# Patient Record
Sex: MALE | Race: WHITE | NOT HISPANIC OR LATINO | Employment: UNEMPLOYED | URBAN - METROPOLITAN AREA
[De-identification: names, ages, dates, MRNs, and addresses within clinical notes are randomized per-mention and may not be internally consistent; named-entity substitution may affect disease eponyms.]

---

## 2024-10-15 ENCOUNTER — HOSPITAL ENCOUNTER (EMERGENCY)
Facility: HOSPITAL | Age: 59
Discharge: OTHER INSTITUTION | End: 2024-10-16
Attending: EMERGENCY MEDICINE
Payer: COMMERCIAL

## 2024-10-15 DIAGNOSIS — F10.929 ALCOHOL INTOXICATION (HCC): Primary | ICD-10-CM

## 2024-10-15 PROCEDURE — 99284 EMERGENCY DEPT VISIT MOD MDM: CPT

## 2024-10-16 ENCOUNTER — APPOINTMENT (EMERGENCY)
Dept: CT IMAGING | Facility: HOSPITAL | Age: 59
End: 2024-10-16
Payer: COMMERCIAL

## 2024-10-16 VITALS
RESPIRATION RATE: 18 BRPM | BODY MASS INDEX: 27.85 KG/M2 | DIASTOLIC BLOOD PRESSURE: 98 MMHG | HEART RATE: 68 BPM | TEMPERATURE: 97.7 F | SYSTOLIC BLOOD PRESSURE: 162 MMHG | WEIGHT: 210.1 LBS | OXYGEN SATURATION: 93 % | HEIGHT: 73 IN

## 2024-10-16 PROCEDURE — 99284 EMERGENCY DEPT VISIT MOD MDM: CPT | Performed by: EMERGENCY MEDICINE

## 2024-10-16 PROCEDURE — 70450 CT HEAD/BRAIN W/O DYE: CPT

## 2024-10-16 NOTE — ED NOTES
BAT Completed 0.221 currently. Patient reports no concerns, Vital signs stable. Plan of care ongoing.     Sarai Clements RN  10/16/24 7534

## 2024-10-16 NOTE — ED NOTES
Contacted [facility] and spoke to Teena of their Nursing department. Per Teena they would accept the patient when his BAT was below 0.24. Advised her the patient's BAT was 0.221. Notified Arabella ADKINS would fax d/c papers as requested. Once the Staff at [facility] received documentation, they would set up his ride. Provider , Charge and patient notified.      Sarai Clements, RN  10/16/24 0230

## 2024-10-16 NOTE — ED PROVIDER NOTES
"Time reflects when diagnosis was documented in both MDM as applicable and the Disposition within this note       Time User Action Codes Description Comment    10/16/2024  2:33 AM Javier Palomino Add [F10.929] Alcohol intoxication (HCC)     10/16/2024  4:27 AM Javier Palomino Modify [F10.929] Alcohol intoxication (HCC)           ED Disposition       ED Disposition   Discharge    Condition   Stable    Date/Time   Wed Oct 16, 2024  2:34 AM    Comment   Jimmy Garay discharge to home/self care.                   Assessment & Plan       Medical Decision Making  59-year-old male presents to the emergency room due to his alcohol level being too high from his rehabilitation facility.    Patient denies any symptoms.  Patient is aware that he is in the hospital and that he was going to rehabilitation.    Patient states he drinks alcohol every day and states that \"I need to get sober.\"    Patient denies use of any illicit substances.  Patient denies use of any alcohol substitutes.  Patient denies any falls or trauma.  Patient denies any thoughts self-harm or harm to others.    Impression and plan: Alcohol intoxication.  Patient's alcohol level is notably elevated on initial point-of-care testing.  I offered patient additional evaluation which he declined.  Despite patient's alcohol level being elevated, he appears to have capacity make his medical decisions.  Patient is amenable to monitoring in the emergency room until he is able to be cleared to go to his facility for continued management of his alcohol use.  Will monitor patient, reassess, and reevaluate.    Amount and/or Complexity of Data Reviewed  Radiology: ordered.        ED Course as of 10/16/24 2132   Wed Oct 16, 2024   0229 Patient's alcohol level improved below the limit requested by his rehab facility.  I reassessed the patient who continues to deny any symptoms.  Patient requesting discharge.  Will discuss with his facility.   0431 Patient CT without acute " findings.  I discussed with him the need for follow-up with primary care for continued management provided information and referral for this.  Patient being return to his rehabilitation facility for continued management of his alcohol.       Medications - No data to display    ED Risk Strat Scores                CIWA-Ar Score       Row Name 10/16/24 0100 10/16/24 0000 10/15/24 2311       CIWA-Ar    Blood Pressure 142/77 -- 152/79    Pulse 77 -- 60    Nausea and Vomiting 0 0 0    Tactile Disturbances 0 0 0    Tremor 1 2 2    Auditory Disturbances 0 0 0    Paroxysmal Sweats 0 0 0    Visual Disturbances 0 0 0    Anxiety 0 0 0    Headache, Fullness in Head 0 0 0    Agitation 0 0 0    Orientation and Clouding of Sensorium 1 1 2    CIWA-Ar Total 2 3 4                            SBIRT 20yo+      Flowsheet Row Most Recent Value   Initial Alcohol Screen: US AUDIT-C     1. How often do you have a drink containing alcohol? 6 Filed at: 10/15/2024 2309   2. How many drinks containing alcohol do you have on a typical day you are drinking?  6 Filed at: 10/15/2024 2309   3a. Male UNDER 65: How often do you have five or more drinks on one occasion? 6 Filed at: 10/15/2024 2309   Audit-C Score 18 Filed at: 10/15/2024 2309   Full Alcohol Screen: US AUDIT    4. How often during the last year have you found that you were not able to stop drinking once you had started? 4 Filed at: 10/15/2024 2309   5. How often during past year have you failed to do what was normally expected of you because of drinking?  4 Filed at: 10/15/2024 2309   6. How often in past year have you needed a first drink in the morning to get yourself going after a heavy drinking session?  1 Filed at: 10/15/2024 2309   7. How often in past year have you had feeling of guilt or remorse after drinking?  3 Filed at: 10/15/2024 2309   8. How often in past year have you been unable to remember what happened night before because you had been drinking?  4 Filed at: 10/15/2024  2309   9. Have you or someone else been injured as a result of your drinking?  0 Filed at: 10/15/2024 2309   10. Has a relative, friend, doctor or other health worker been concerned about your drinking and suggested you cut down?  4 Filed at: 10/15/2024 2309   AUDIT Total Score 38 Filed at: 10/15/2024 2309   DOREEN: How many times in the past year have you...    Used an illegal drug or used a prescription medication for non-medical reasons? Never Filed at: 10/15/2024 2309                            History of Present Illness       Chief Complaint   Patient presents with    Alcohol Intoxication     Came by EMS from [facility] due to blood alcohol being too high. (0.305)        Past Medical History:   Diagnosis Date    Disease of thyroid gland     Hypertension       History reviewed. No pertinent surgical history.   History reviewed. No pertinent family history.   Social History     Tobacco Use    Smoking status: Every Day     Current packs/day: 1.00     Average packs/day: 1 pack/day for 40.8 years (40.8 ttl pk-yrs)     Types: Cigarettes     Start date: 1984   Substance Use Topics    Alcohol use: Yes     Alcohol/week: 10.0 standard drinks of alcohol     Types: 10 Glasses of wine per week    Drug use: Never      E-Cigarette/Vaping      E-Cigarette/Vaping Substances      I have reviewed and agree with the history as documented.     HPI    Review of Systems        Objective       ED Triage Vitals [10/15/24 2305]   Temperature Pulse Blood Pressure Respirations SpO2 Patient Position - Orthostatic VS   97.7 °F (36.5 °C) 60 152/79 18 96 % Lying      Temp Source Heart Rate Source BP Location FiO2 (%) Pain Score    Oral Monitor Right arm -- No Pain      Vitals      Date and Time Temp Pulse SpO2 Resp BP Pain Score FACES Pain Rating User   10/16/24 0302 -- 68 93 % -- 162/98 -- --    10/16/24 0230 -- 72 92 % 18 103/59 No Pain -- KM   10/16/24 0200 -- 75 92 % 18 106/57 No Pain -- KM   10/16/24 0100 -- 77 -- -- 142/77 -- -- KW    10/16/24 0030 -- -- 92 % -- -- -- -- KM   10/16/24 0030 -- 75 -- -- 124/70 -- -- KW   10/16/24 0000 -- 72 92 % -- 130/73 -- -- KW   10/15/24 2311 -- 60 -- -- 152/79 -- -- KW   10/15/24 2305 97.7 °F (36.5 °C) 60 96 % 18 152/79 No Pain -- KW            Physical Exam  Constitutional:       Appearance: Normal appearance.   Cardiovascular:      Rate and Rhythm: Normal rate and regular rhythm.   Abdominal:      Palpations: Abdomen is soft.      Tenderness: There is no abdominal tenderness. There is no guarding or rebound.   Neurological:      General: No focal deficit present.      Mental Status: He is alert.      Gait: Gait normal.   Psychiatric:         Mood and Affect: Mood and affect normal.         Speech: Speech is not slurred.         Thought Content: Thought content does not include homicidal or suicidal ideation. Thought content does not include homicidal or suicidal plan.         Results Reviewed       None            CT head without contrast   Final Interpretation by Nicole Amanda MD (10/16 0357)      No acute intracranial abnormality. Mild microangiopathy. Old bilateral lacunae.                  Workstation performed: [id number]             Procedures    ED Medication and Procedure Management   None     There are no discharge medications for this patient.      ED SEPSIS DOCUMENTATION   Time reflects when diagnosis was documented in both MDM as applicable and the Disposition within this note       Time User Action Codes Description Comment    10/16/2024  2:33 AM Javier Palomino Add [F10.929] Alcohol intoxication (HCC)     10/16/2024  4:27 AM Javier aPlomino Modify [F10.929] Alcohol intoxication (HCC)                  Javier Palomino MD  10/16/24 6360

## 2024-10-16 NOTE — ED NOTES
Dc paperwork faxed to [facility]. [facility] to arrange  of pt from ZORAN Vargas RN  10/16/24 5173

## 2025-04-03 ENCOUNTER — HOSPITAL ENCOUNTER (EMERGENCY)
Facility: HOSPITAL | Age: 60
Discharge: HOME | End: 2025-04-03
Attending: EMERGENCY MEDICINE
Payer: COMMERCIAL

## 2025-04-03 VITALS
TEMPERATURE: 97.6 F | RESPIRATION RATE: 17 BRPM | DIASTOLIC BLOOD PRESSURE: 92 MMHG | HEART RATE: 64 BPM | SYSTOLIC BLOOD PRESSURE: 177 MMHG | OXYGEN SATURATION: 99 %

## 2025-04-03 DIAGNOSIS — Z00.8 ENCOUNTER FOR PSYCHOLOGICAL EVALUATION: Primary | ICD-10-CM

## 2025-04-03 PROCEDURE — 99284 EMERGENCY DEPT VISIT MOD MDM: CPT | Performed by: EMERGENCY MEDICINE

## 2025-04-03 PROCEDURE — 99282 EMERGENCY DEPT VISIT SF MDM: CPT

## 2025-04-03 RX ORDER — HYDROXYZINE HYDROCHLORIDE 25 MG/1
25 TABLET, FILM COATED ORAL ONCE
Status: COMPLETED | OUTPATIENT
Start: 2025-04-03 | End: 2025-04-03

## 2025-04-03 RX ADMIN — HYDROXYZINE HYDROCHLORIDE 25 MG: 25 TABLET, FILM COATED ORAL at 15:59

## 2025-04-03 NOTE — ED PROVIDER NOTES
Time reflects when diagnosis was documented in both MDM as applicable and the Disposition within this note       Time User Action Codes Description Comment    4/3/2025  4:17 PM Marcela Bello Add [Z00.8] Encounter for psychological evaluation           ED Disposition       ED Disposition   Discharge    Condition   Stable    Date/Time   Thu Apr 3, 2025  4:17 PM    Comment   Jimmy Garay discharge to home/self care.                   Assessment & Plan       Medical Decision Making  Patient is a pleasant 60 yoM who presents from [facility] due to SI. Reports had an episode of SI after a triggering therapy session. No longer reporting SI. States he feels safe  at the facility - no access to controlled meds or alcohol. Is comfortable with staff and strongly prefers to return to [facility] to complete treatment for EtOH dependence.   As he is endorsing positive motivation for treatment, and safety in his current environment, benefit of inpatient psychiatric stay likely to be low at this time while also risking completion of therapy for etoh dependence.     Amount and/or Complexity of Data Reviewed  External Data Reviewed: notes.     Details: Med rec and [facility] facility documentation reviewed    Risk  Prescription drug management.             Medications   hydrOXYzine HCL (ATARAX) tablet 25 mg (25 mg Oral Given 4/3/25 4704)       ED Risk Strat Scores                                                History of Present Illness       Chief Complaint   Patient presents with    Psychiatric Evaluation     Pt BIBA from [facility] with thoughts of self harm after a triggering therapy session. Pt had thoughts a month ago when his drinking was bad. Pt's last drink was 5 days ago. Counselor at [facility] requested he be evaluated       Past Medical History:   Diagnosis Date    Cataract     Disease of thyroid gland     Hypertension     Neuropathy       History reviewed. No pertinent surgical history.   History reviewed. No  pertinent family history.   Social History     Tobacco Use    Smoking status: Every Day     Current packs/day: 1.00     Average packs/day: 1 pack/day for 41.3 years (41.3 ttl pk-yrs)     Types: Cigarettes     Start date: 1984    Smokeless tobacco: Never   Vaping Use    Vaping status: Never Used   Substance Use Topics    Alcohol use: Yes     Alcohol/week: 10.0 standard drinks of alcohol     Types: 10 Glasses of wine per week     Comment: last drink 5 days ago    Drug use: Never      E-Cigarette/Vaping    E-Cigarette Use Never User       E-Cigarette/Vaping Substances    Nicotine No     THC No     CBD No     Flavoring No     Other No     Unknown No       I have reviewed and agree with the history as documented.     Patient presents with report of prior SI          Review of Systems   Psychiatric/Behavioral:  Negative for self-injury. The patient is nervous/anxious.    All other systems reviewed and are negative.          Objective       ED Triage Vitals [04/03/25 1608]   Temperature Pulse Blood Pressure Respirations SpO2 Patient Position - Orthostatic VS   97.6 °F (36.4 °C) 64 (!) 177/92 17 99 % Sitting      Temp Source Heart Rate Source BP Location FiO2 (%) Pain Score    Oral Monitor Right arm -- --      Vitals      Date and Time Temp Pulse SpO2 Resp BP Pain Score FACES Pain Rating User   04/03/25 1610 97.6 °F (36.4 °C) 64 99 % 17 177/92 -- -- KW   04/03/25 1608 97.6 °F (36.4 °C) 64 99 % 17 177/92 -- -- KW            Physical Exam  Vitals and nursing note reviewed.   Constitutional:       General: He is not in acute distress.     Appearance: Normal appearance.   HENT:      Head: Normocephalic and atraumatic.      Right Ear: External ear normal.      Left Ear: External ear normal.      Nose: Nose normal.   Cardiovascular:      Rate and Rhythm: Normal rate and regular rhythm.   Pulmonary:      Effort: Pulmonary effort is normal.      Breath sounds: Normal breath sounds.   Abdominal:      General: There is no distension.       Palpations: Abdomen is soft.      Tenderness: There is no abdominal tenderness.   Musculoskeletal:      Right lower leg: No edema.      Left lower leg: No edema.   Skin:     General: Skin is warm and dry.   Neurological:      General: No focal deficit present.      Mental Status: He is alert and oriented to person, place, and time. Mental status is at baseline.   Psychiatric:         Mood and Affect: Mood normal.         Behavior: Behavior normal. Behavior is cooperative.         Thought Content: Thought content normal. Thought content does not include homicidal or suicidal ideation.         Judgment: Judgment normal.         Results Reviewed       None            No orders to display       Procedures    ED Medication and Procedure Management   None     There are no discharge medications for this patient.    No discharge procedures on file.  ED SEPSIS DOCUMENTATION   Time reflects when diagnosis was documented in both MDM as applicable and the Disposition within this note       Time User Action Codes Description Comment    4/3/2025  4:17 PM Marcela Bello Add [Z00.8] Encounter for psychological evaluation                  Marcela Bello MD  04/03/25 1188

## 2025-06-09 ENCOUNTER — HOSPITAL ENCOUNTER (EMERGENCY)
Facility: HOSPITAL | Age: 60
Discharge: HOME | End: 2025-06-09
Attending: EMERGENCY MEDICINE | Admitting: EMERGENCY MEDICINE
Payer: COMMERCIAL

## 2025-06-09 ENCOUNTER — APPOINTMENT (EMERGENCY)
Dept: RADIOLOGY | Facility: HOSPITAL | Age: 60
End: 2025-06-09
Payer: COMMERCIAL

## 2025-06-09 VITALS
TEMPERATURE: 97.4 F | HEART RATE: 63 BPM | DIASTOLIC BLOOD PRESSURE: 85 MMHG | WEIGHT: 210.1 LBS | BODY MASS INDEX: 27.72 KG/M2 | SYSTOLIC BLOOD PRESSURE: 147 MMHG | RESPIRATION RATE: 16 BRPM | OXYGEN SATURATION: 92 %

## 2025-06-09 DIAGNOSIS — J06.9 VIRAL URI WITH COUGH: Primary | ICD-10-CM

## 2025-06-09 LAB
FLUAV AG UPPER RESP QL IA.RAPID: NEGATIVE
FLUBV AG UPPER RESP QL IA.RAPID: NEGATIVE
SARS-COV+SARS-COV-2 AG RESP QL IA.RAPID: NEGATIVE

## 2025-06-09 PROCEDURE — 71046 X-RAY EXAM CHEST 2 VIEWS: CPT

## 2025-06-09 PROCEDURE — 99284 EMERGENCY DEPT VISIT MOD MDM: CPT

## 2025-06-09 PROCEDURE — 87811 SARS-COV-2 COVID19 W/OPTIC: CPT

## 2025-06-09 PROCEDURE — 99283 EMERGENCY DEPT VISIT LOW MDM: CPT

## 2025-06-09 PROCEDURE — 87804 INFLUENZA ASSAY W/OPTIC: CPT

## 2025-06-09 PROCEDURE — 94640 AIRWAY INHALATION TREATMENT: CPT

## 2025-06-09 RX ORDER — METOPROLOL TARTRATE 100 MG/1
100 TABLET ORAL 2 TIMES DAILY
COMMUNITY

## 2025-06-09 RX ORDER — BUSPIRONE HYDROCHLORIDE 30 MG/1
30 TABLET ORAL 2 TIMES DAILY
COMMUNITY

## 2025-06-09 RX ORDER — LEVOTHYROXINE SODIUM 100 UG/1
100 TABLET ORAL DAILY
COMMUNITY

## 2025-06-09 RX ORDER — ALBUTEROL SULFATE 90 UG/1
2 INHALANT RESPIRATORY (INHALATION) ONCE
Status: COMPLETED | OUTPATIENT
Start: 2025-06-09 | End: 2025-06-09

## 2025-06-09 RX ORDER — HYDROCHLOROTHIAZIDE 25 MG/1
25 TABLET ORAL DAILY
COMMUNITY

## 2025-06-09 RX ORDER — AMLODIPINE BESYLATE 10 MG/1
10 TABLET ORAL DAILY
COMMUNITY

## 2025-06-09 RX ORDER — NAPROXEN 250 MG/1
250 TABLET ORAL 2 TIMES DAILY WITH MEALS
COMMUNITY

## 2025-06-09 RX ORDER — IPRATROPIUM BROMIDE AND ALBUTEROL SULFATE 2.5; .5 MG/3ML; MG/3ML
3 SOLUTION RESPIRATORY (INHALATION) ONCE
Status: COMPLETED | OUTPATIENT
Start: 2025-06-09 | End: 2025-06-09

## 2025-06-09 RX ADMIN — ALBUTEROL SULFATE 2 PUFF: 90 AEROSOL, METERED RESPIRATORY (INHALATION) at 09:36

## 2025-06-09 RX ADMIN — IPRATROPIUM BROMIDE AND ALBUTEROL SULFATE 3 ML: 2.5; .5 SOLUTION RESPIRATORY (INHALATION) at 09:36

## 2025-06-09 NOTE — ED ATTENDING ATTESTATION
6/9/2025  IFrances MD, saw and evaluated the patient. I have discussed the patient with the resident/non-physician practitioner and agree with the resident's/non-physician practitioner's findings, Plan of Care, and MDM as documented in the resident's/non-physician practitioner's note, except where noted. All available labs and Radiology studies were reviewed.  I was present for key portions of any procedure(s) performed by the resident/non-physician practitioner and I was immediately available to provide assistance.       At this point I agree with the current assessment done in the Emergency Department.  I have conducted an independent evaluation of this patient a history and physical is as follows:    ED Course     61 yo M presents with cough, wheezing, congestion and sinus pressure.     General: VSS, NAD, awake, alert. Well-nourished, well-developed. Appears stated age.   Speaking normally in full sentences.   Head: Normocephalic, atraumatic, nontender.  Eyes: PERRL, EOM-I. No diplopia.   No hyphema.   No subconjunctival hemorrhages.  Symmetrical lids.   ENT: Atraumatic external nose and ears.    MMM, mild frontal TTP  No malocclusion. No stridor. Normal phonation. No drooling. Normal swallowing.   Neck: Symmetric, trachea midline. No JVD.  CV: RRR. +S1/S2  No murmurs or gallops  Peripheral pulses +2 throughout. No chest wall tenderness.   Lungs:   Unlabored No retractions  Expiratory wheeze  No tachypnea.   Abd: +BS, soft, NT/ND.   MSK:   FROM   Back:   No rashes  Skin: Dry, intact.   Neuro: AAOx3, GCS 15, CN II-XII grossly intact.   Motor grossly intact.  Psychiatric/Behavioral: Appropriate mood and affect   Exam: deferred     On exam, mild frontal sinus tenderness, lungs with expiratory wheeze. Will obtain CXR, give inhaler for bronchitis.    Critical Care Time  Procedures

## 2025-06-09 NOTE — ED PROVIDER NOTES
Time reflects when diagnosis was documented in both MDM as applicable and the Disposition within this note       Time User Action Codes Description Comment    6/9/2025 10:26 AM Daryn Loco [J06.9] Viral URI with cough           ED Disposition       ED Disposition   Discharge    Condition   Stable    Date/Time   Mon Jun 9, 2025 10:26 AM    Comment   Jimmy Garay discharge to home/self care.                   Assessment & Plan       Medical Decision Making  Patient is a 60-year-old male presenting to the emergency department for evaluation of a 5-day history of productive cough, sinus pressure, congestion.  No formal diagnosis of asthma or COPD but has had borderline PFTs in the past.  Not on any maintenance inhalers or steroids.  Denies any chest pain or shortness of breath.  No fevers or chills.  Reports some decreased oral intake with nausea but no vomiting or changes in bowel movements.    Upon initial evaluation, vital signs are stable and patient is in no acute distress.  Nasal congestion upon inspection of the nares with no rhinorrhea.  Intermittent wheezing auscultated throughout with some scattered upper respiratory noises that clears with a cough.  No cervical lymphadenopathy or chest tenderness to palpation.  Symptoms appear consistent with upper respiratory infection.  Plan to rule out pneumonia with two-view chest x-ray.  Will check for common causes of viral URI with COVID/flu swab.  Plan to treat wheezing with DuoNeb.    Viral swab negative for covid/flu. Patient reporting improvement in cough frequency following DuoNeb and improvement of wheezing upon auscultation of the lungs. Patient is appropriate for discharge at this time. Discussed that symptoms are likely secondary to another virus not routinely tested for, and that there is no current indication for antibiotics at this time given duration of symptoms. Provided albuterol inhaler for continued cough, and also recommended over the  counter medications such as Flonase to help reduce congestion and sinus pressure, as well as Mucinex to help thin secretions associated with productive cough. Reviewed return precautions with the patient including new or worsening symptoms, development of fever, shortness of breath, or increase in purulent nasal discharge. Patient understands and agrees to the stated plan.     Amount and/or Complexity of Data Reviewed  Labs: ordered. Decision-making details documented in ED Course.  Radiology: ordered and independent interpretation performed.    Risk  Prescription drug management.        ED Course as of 06/09/25 1540   Mon Jun 09, 2025   1011 FLU/COVID Rapid Antigen (30 min. TAT) - Preferred screening test in ED  Negative for covid/flu       Medications   ipratropium-albuterol (DUO-NEB) 0.5-2.5 mg/3 mL inhalation solution 3 mL (3 mL Nebulization Given 6/9/25 0936)   albuterol (PROVENTIL HFA,VENTOLIN HFA) inhaler 2 puff (2 puffs Inhalation Given 6/9/25 0936)       ED Risk Strat Scores                    No data recorded        SBIRT 20yo+      Flowsheet Row Most Recent Value   Initial Alcohol Screen: US AUDIT-C     1. How often do you have a drink containing alcohol? 0 Filed at: 06/09/2025 0853   2. How many drinks containing alcohol do you have on a typical day you are drinking?  0 Filed at: 06/09/2025 0853   3a. Male UNDER 65: How often do you have five or more drinks on one occasion? 0 Filed at: 06/09/2025 0853   3b. FEMALE Any Age, or MALE 65+: How often do you have 4 or more drinks on one occassion? 0 Filed at: 06/09/2025 0853   Audit-C Score 0 Filed at: 06/09/2025 0853   DOREEN: How many times in the past year have you...    Used an illegal drug or used a prescription medication for non-medical reasons? Never Filed at: 06/09/2025 0853                            History of Present Illness       Chief Complaint   Patient presents with    Cough     Cough and congestion x 1 week, denies fevers and difficulty  breathing         Past Medical History[1]   Past Surgical History[2]   Family History[3]   Social History[4]   E-Cigarette/Vaping    E-Cigarette Use Never User       E-Cigarette/Vaping Substances    Nicotine No     THC No     CBD No     Flavoring No     Other No     Unknown No       I have reviewed and agree with the history as documented.     Jimmy is a 60-year-old male with past medical history of hypertension, hypothyroidism, anxiety, alcohol abuse presenting to the emergency department for a 5-day history of cough, congestion, and sinus pressure.  He states symptoms began with a cough which was dry in nature and over the weekend progressed to a more productive cough with increasing nasal congestion and sinus pressure.  He denies any formal diagnosis of asthma or COPD, but has been tested for COPD with borderline PFTs.  He is not in any maintenance inhalers.  He denies any fevers or chills, chest pain or shortness of breath, abdominal pain, vomiting or diarrhea, dysuria, back pain, lightheadedness or dizziness.  Does report some decreased appetite over the past few days with some nausea but no vomiting.  He has not been taking any thing for his symptoms up to this point.  Denies any close sick contacts.  Reports he currently resides in a partial hospitalization program for alcohol rehab.      History provided by:  Patient   used: No        Review of Systems   Constitutional:  Positive for appetite change. Negative for chills and fever.   HENT:  Positive for congestion and sinus pressure. Negative for ear pain, hearing loss, sore throat and trouble swallowing.    Eyes:  Negative for redness.   Respiratory:  Negative for shortness of breath.    Cardiovascular:  Negative for chest pain.   Gastrointestinal:  Positive for nausea. Negative for abdominal pain, diarrhea and vomiting.   Genitourinary:  Negative for dysuria.   Musculoskeletal:  Negative for back pain and neck pain.   Skin:   Negative for rash.   Neurological:  Negative for dizziness, light-headedness and headaches.   All other systems reviewed and are negative.          Objective       ED Triage Vitals [06/09/25 0851]   Temperature Pulse Blood Pressure Respirations SpO2 Patient Position - Orthostatic VS   (!) 97.4 °F (36.3 °C) 58 143/82 18 94 % Sitting      Temp Source Heart Rate Source BP Location FiO2 (%) Pain Score    Oral Monitor Right arm -- No Pain      Vitals      Date and Time Temp Pulse SpO2 Resp BP Pain Score FACES Pain Rating User   06/09/25 1038 -- 63 92 % 16 147/85 -- -- LM   06/09/25 0851 97.4 °F (36.3 °C) 58 94 % 18 143/82 No Pain -- JS            Physical Exam  Vitals and nursing note reviewed.   Constitutional:       General: He is not in acute distress.     Appearance: Normal appearance. He is not ill-appearing.   HENT:      Head: Normocephalic.      Right Ear: External ear normal.      Left Ear: External ear normal.      Nose: Congestion present.      Mouth/Throat:      Mouth: Mucous membranes are dry.      Pharynx: Oropharynx is clear. No oropharyngeal exudate or posterior oropharyngeal erythema.     Eyes:      General:         Right eye: No discharge.         Left eye: No discharge.      Conjunctiva/sclera: Conjunctivae normal.      Comments: Eye patch covering right eye     Cardiovascular:      Rate and Rhythm: Normal rate and regular rhythm.      Pulses: Normal pulses.   Pulmonary:      Effort: Pulmonary effort is normal. No respiratory distress.      Breath sounds: Wheezing present. No rhonchi or rales.   Chest:      Chest wall: No tenderness.   Abdominal:      Palpations: Abdomen is soft.      Tenderness: There is no abdominal tenderness. There is no guarding or rebound.     Musculoskeletal:         General: Normal range of motion.      Cervical back: No tenderness.      Right lower leg: No edema.      Left lower leg: No edema.   Lymphadenopathy:      Cervical: No cervical adenopathy.     Skin:     General: Skin  is warm and dry.      Capillary Refill: Capillary refill takes less than 2 seconds.     Neurological:      Mental Status: He is alert and oriented to person, place, and time.     Psychiatric:         Mood and Affect: Mood normal.         Behavior: Behavior normal.         Results Reviewed       Procedure Component Value Units Date/Time    FLU/COVID Rapid Antigen (30 min. TAT) - Preferred screening test in ED [[id number]]  (Normal) Collected: 06/09/25 0936    Lab Status: Final result Specimen: Nares from Nose Updated: 06/09/25 0959     SARS COV Rapid Antigen Negative     Influenza A Rapid Antigen Negative     Influenza B Rapid Antigen Negative    Narrative:      This test has been performed using the [employer] Lydia 2 FLU+SARS Antigen test under the Emergency Use Authorization (EUA). This test has been validated by the  and verified by the performing laboratory. The Lydia uses lateral flow immunofluorescent sandwich assay to detect SARS-COV, Influenza A and Influenza B Antigen.     The [employer] Lydia 2 SARS Antigen test does not differentiate between SARS-CoV and SARS-CoV-2.     Negative results are presumptive and may be confirmed with a molecular assay, if necessary, for patient management. Negative results do not rule out SARS-CoV-2 or influenza infection and should not be used as the sole basis for treatment or patient management decisions. A negative test result may occur if the level of antigen in a sample is below the limit of detection of this test.     Positive results are indicative of the presence of viral antigens, but do not rule out bacterial infection or co-infection with other viruses.     All test results should be used as an adjunct to clinical observations and other information available to the provider.    FOR PEDIATRIC PATIENTS - copy/paste COVID Guidelines URL to browser: [email or web address]            XR chest 2 views   ED  Interpretation by Daryn Loco PA-C (06/09 1036)   No acute cardiopulmonary disease      Final Interpretation by Rodney Durbin MD (06/09 1327)      No acute cardiopulmonary disease.            Workstation performed: [id number]             Procedures    ED Medication and Procedure Management   Prior to Admission Medications   Prescriptions Last Dose Informant Patient Reported? Taking?   amLODIPine (NORVASC) 10 mg tablet 6/9/2025 Morning  Yes Yes   Sig: Take 10 mg by mouth daily   busPIRone (BUSPAR) 30 MG tablet 6/9/2025 Morning  Yes Yes   Sig: Take 30 mg by mouth 2 (two) times a day   hydroCHLOROthiazide 25 mg tablet 6/9/2025 Morning  Yes Yes   Sig: Take 25 mg by mouth daily   levothyroxine 100 mcg tablet 6/9/2025 Morning  Yes Yes   Sig: Take 100 mcg by mouth daily   metoprolol tartrate (LOPRESSOR) 100 mg tablet 6/9/2025 Morning  Yes Yes   Sig: Take 100 mg by mouth 2 (two) times a day   naproxen (NAPROSYN) 250 mg tablet 6/9/2025 Morning  Yes Yes   Sig: Take 250 mg by mouth 2 (two) times a day with meals   sertraline (ZOLOFT) 50 mg tablet 6/9/2025 Morning  Yes Yes   Sig: Take 50 mg by mouth daily      Facility-Administered Medications: None     Discharge Medication List as of 6/9/2025 10:36 AM        CONTINUE these medications which have NOT CHANGED    Details   amLODIPine (NORVASC) 10 mg tablet Take 10 mg by mouth daily, Historical Med      busPIRone (BUSPAR) 30 MG tablet Take 30 mg by mouth 2 (two) times a day, Historical Med      hydroCHLOROthiazide 25 mg tablet Take 25 mg by mouth daily, Historical Med      levothyroxine 100 mcg tablet Take 100 mcg by mouth daily, Historical Med      metoprolol tartrate (LOPRESSOR) 100 mg tablet Take 100 mg by mouth 2 (two) times a day, Historical Med      naproxen (NAPROSYN) 250 mg tablet Take 250 mg by mouth 2 (two) times a day with meals, Historical Med      sertraline (ZOLOFT) 50 mg tablet Take 50 mg by mouth daily, Historical Med           No discharge  procedures on file.  ED SEPSIS DOCUMENTATION   Time reflects when diagnosis was documented in both MDM as applicable and the Disposition within this note       Time User Action Codes Description Comment    6/9/2025 10:26 AM Daryn Loco Add [J06.9] Viral URI with cough                      [1]   Past Medical History:  Diagnosis Date    Cataract     Disease of thyroid gland     Hypertension     Neuropathy    [2] No past surgical history on file.  [3] No family history on file.  [4]   Social History  Tobacco Use    Smoking status: Every Day     Current packs/day: 1.00     Average packs/day: 1 pack/day for 41.4 years (41.4 ttl pk-yrs)     Types: Cigarettes     Start date: 1984    Smokeless tobacco: Never   Vaping Use    Vaping status: Never Used   Substance Use Topics    Alcohol use: Yes     Alcohol/week: 10.0 standard drinks of alcohol     Types: 10 Glasses of wine per week     Comment: last drink 5 days ago    Drug use: Never        Daryn Loco PA-C  06/09/25 1541

## 2025-06-09 NOTE — DISCHARGE INSTRUCTIONS
Your chest x-ray was negative for pneumonia, and your viral swab was negative for Covid and Flu. You are likely experiencing a upper respiratory infection caused by a virus not routinely tested for. Continue to monitor your symptoms over the next days to week. If your symptoms persist or worsen, you should return to the ER for reevaluation.     Please use your albuterol inhaler, two puffs every 2 hours as needed, for coughing. Please see attached instructions on how to use your inhaler. You can also try over the counter medications like Flonase, an intranasal spray that helps reduce congestion and sinus pressure, and Mucinex (guaifenesin) to help reduce thickness of secretions related to cough.